# Patient Record
Sex: FEMALE | Race: WHITE | NOT HISPANIC OR LATINO | Employment: PART TIME | URBAN - METROPOLITAN AREA
[De-identification: names, ages, dates, MRNs, and addresses within clinical notes are randomized per-mention and may not be internally consistent; named-entity substitution may affect disease eponyms.]

---

## 2021-01-11 RX ORDER — NORETHINDRONE ACETATE AND ETHINYL ESTRADIOL 1MG-20(21)
1 KIT ORAL DAILY
Qty: 28 TABLET | Refills: 2 | Status: SHIPPED | OUTPATIENT
Start: 2021-01-11 | End: 2021-01-19 | Stop reason: SDUPTHER

## 2021-01-11 NOTE — TELEPHONE ENCOUNTER
Patient is [facility] so Is unable to come in due to the distance

## 2021-01-19 DIAGNOSIS — Z30.41 ENCOUNTER FOR SURVEILLANCE OF CONTRACEPTIVE PILLS: Primary | ICD-10-CM

## 2021-01-19 RX ORDER — NORETHINDRONE ACETATE AND ETHINYL ESTRADIOL 1MG-20(21)
1 KIT ORAL DAILY
Qty: 28 TABLET | Refills: 2 | Status: SHIPPED | OUTPATIENT
Start: 2021-01-19 | End: 2021-04-05

## 2021-01-19 NOTE — TELEPHONE ENCOUNTER
Patient's prescription that was sent 1/11/2021 was not sent to the correct [facility]; she is currently out of town for school. Placing Rx refill request to correct pharmacy ([address]). Annual appointment scheduled for 5/18/2021 at 1:30pm with Dr. Lopez.    Encouraged patient to call and ask her health center questions about the type of appointments they take so that she may remain consistent with her annual exams at times more convenient for her. Answered patient questions/concerns.

## 2021-04-05 DIAGNOSIS — Z30.41 ENCOUNTER FOR SURVEILLANCE OF CONTRACEPTIVE PILLS: ICD-10-CM

## 2021-04-05 RX ORDER — NORETHINDRONE ACETATE AND ETHINYL ESTRADIOL AND FERROUS FUMARATE 1MG-20(21)
KIT ORAL
Qty: 84 TABLET | Refills: 0 | Status: SHIPPED | OUTPATIENT
Start: 2021-04-05 | End: 2021-05-18 | Stop reason: SDUPTHER

## 2021-05-18 ENCOUNTER — OFFICE VISIT (OUTPATIENT)
Dept: OBSTETRICS AND GYNECOLOGY | Facility: CLINIC | Age: 21
End: 2021-05-18

## 2021-05-18 VITALS
DIASTOLIC BLOOD PRESSURE: 68 MMHG | WEIGHT: 172.4 LBS | BODY MASS INDEX: 30.55 KG/M2 | SYSTOLIC BLOOD PRESSURE: 112 MMHG | HEIGHT: 63 IN

## 2021-05-18 DIAGNOSIS — Z30.41 ENCOUNTER FOR SURVEILLANCE OF CONTRACEPTIVE PILLS: ICD-10-CM

## 2021-05-18 DIAGNOSIS — Z20.2 POSSIBLE EXPOSURE TO STD: Primary | ICD-10-CM

## 2021-05-18 DIAGNOSIS — Z01.419 ENCOUNTER FOR ANNUAL ROUTINE GYNECOLOGICAL EXAMINATION: ICD-10-CM

## 2021-05-18 PROCEDURE — 99395 PREV VISIT EST AGE 18-39: CPT | Performed by: OBSTETRICS & GYNECOLOGY

## 2021-05-18 RX ORDER — NORETHINDRONE ACETATE AND ETHINYL ESTRADIOL 1MG-20(21)
1 KIT ORAL DAILY
Qty: 84 TABLET | Refills: 3 | Status: SHIPPED | OUTPATIENT
Start: 2021-05-18 | End: 2022-06-16

## 2021-05-18 RX ORDER — ALBUTEROL SULFATE 90 UG/1
2 AEROSOL, METERED RESPIRATORY (INHALATION) EVERY 4 HOURS PRN
COMMUNITY

## 2021-05-18 NOTE — PROGRESS NOTES
GYN Annual Exam     CC- Here for annual exam.   Subjective   HPI  Beatriz Gallardo is a 20 y.o. female established patient who presents for annual well woman exam. Her periods are typically regular every 28-30 days, lasting 3-8 days and are variant in flow and clots are present (when heavy).  She reports moderate dysmenorrhea. Tylenol (or Aleve) typically helps.  Partner Status: Marital Status: single.  New Partners since last visit: no  Condom usage with IC: always.      The patient reports she had an extra menstrual this cycle. LMP 21; menstrual prior 21. This was the first time the patient experienced menstruals so close in frequency.    Patient requesting gonorrhea/chlamydia testing to be performed on urine.    She exercises regularly: yes.  She has concerns about domestic violence: no.    OB History        0    Para   0    Term   0       0    AB   0    Living   0       SAB   0    TAB   0    Ectopic   0    Molar   0    Multiple   0    Live Births   0                Menarche: 13  Current contraception: OCP (estrogen/progesterone)  History of abnormal Pap smear: n/a  Family history of uterine, colon or ovarian cancer: no  Family history of breast cancer: yes - MGGM and PGGM breast  H/o STDs: no  Last pap: n/a  Gardasil: completed  Thromboembolic Disease: uncertain (possible in grandmother who had a stroke)    Health Maintenance   Topic Date Due   • ANNUAL PHYSICAL  Never done   • TDAP/TD VACCINES (1 - Tdap) Never done   • HEPATITIS C SCREENING  Never done   • CHLAMYDIA SCREENING  Never done   • INFLUENZA VACCINE  2021   • COVID-19 Vaccine  Completed   • MENINGOCOCCAL VACCINE  Completed   • HPV VACCINES  Completed   • Pneumococcal Vaccine 0-64  Aged Out       The following portions of the patient's history were reviewed and updated as appropriate: allergies, current medications, past family history, past medical history, past social history, past surgical history and problem list.    Review  "of Systems  Review of Systems   Constitutional: Negative.    HENT: Negative.    Eyes: Negative.    Respiratory: Negative.    Cardiovascular: Negative.    Gastrointestinal: Negative.    Endocrine: Negative.    Genitourinary: Positive for menstrual problem.   Musculoskeletal: Negative.    Skin: Negative.    Allergic/Immunologic: Negative.    Neurological: Negative.    Hematological: Negative.    Psychiatric/Behavioral: Negative.        I have reviewed and agree with the HPI, ROS, and historical information as entered above. Dayana Lopez MD      Past Medical History:   Diagnosis Date   • Anxiety    • Asthma    • Auditory processing disorder     unable to hear high/low pitches; sometimes unable to fully comprehend people when they speak   • Depression    • Gardasil injection series complete    • History of concussion 02/2021        Past Surgical History:   Procedure Laterality Date   • WISDOM TOOTH EXTRACTION            Objective   /68 (BP Location: Right arm, Patient Position: Sitting, Cuff Size: Adult)   Ht 160 cm (63\")   Wt 78.2 kg (172 lb 6.4 oz)   LMP 05/07/2021   Breastfeeding No   BMI 30.54 kg/m²   Physical Exam  Vitals and nursing note reviewed. Exam conducted with a chaperone present.   Constitutional:       Appearance: She is well-developed.   HENT:      Head: Normocephalic and atraumatic.   Cardiovascular:      Rate and Rhythm: Normal rate and regular rhythm.   Pulmonary:      Effort: Pulmonary effort is normal.      Breath sounds: Normal breath sounds.   Abdominal:      General: Bowel sounds are normal.      Palpations: Abdomen is soft. Abdomen is not rigid.   Musculoskeletal:      Cervical back: Normal range of motion. No muscular tenderness.   Skin:     General: Skin is warm and dry.   Neurological:      Mental Status: She is alert and oriented to person, place, and time.   Psychiatric:         Behavior: Behavior normal.            Assessment    Encounter Diagnoses   Name Primary?   • " Possible exposure to STD Yes   • Encounter for annual routine gynecological examination    • Encounter for surveillance of contraceptive pills        Plan     1. Encouraged use of condoms for STD prevention.   Cultures today as quality measure.   2. Happy on current SABINO - understands inherent risks including increase lifetime risk of breast cancer dx, VTE and stroke.  Understands SABINO reduce lifetime risk of ovarian cancer. Understands other options of BC.  3. Recommended use of Vitamin D replacement and getting adequate calcium in her diet. (1500mg)  4. RTC in 1 year or PRN with problems           Dayana Lopez MD  05/18/2021

## 2021-05-20 LAB
C TRACH RRNA SPEC QL NAA+PROBE: NEGATIVE
N GONORRHOEA RRNA SPEC QL NAA+PROBE: NEGATIVE

## 2022-06-16 DIAGNOSIS — Z30.41 ENCOUNTER FOR SURVEILLANCE OF CONTRACEPTIVE PILLS: ICD-10-CM

## 2022-06-16 RX ORDER — NORETHINDRONE ACETATE AND ETHINYL ESTRADIOL AND FERROUS FUMARATE 1MG-20(21)
KIT ORAL
Qty: 28 TABLET | Refills: 0 | Status: SHIPPED | OUTPATIENT
Start: 2022-06-16 | End: 2022-07-06 | Stop reason: SDUPTHER

## 2022-07-06 ENCOUNTER — OFFICE VISIT (OUTPATIENT)
Dept: OBSTETRICS AND GYNECOLOGY | Facility: CLINIC | Age: 22
End: 2022-07-06

## 2022-07-06 VITALS
SYSTOLIC BLOOD PRESSURE: 110 MMHG | BODY MASS INDEX: 31.36 KG/M2 | WEIGHT: 177 LBS | DIASTOLIC BLOOD PRESSURE: 72 MMHG | HEIGHT: 63 IN

## 2022-07-06 DIAGNOSIS — Z30.41 ENCOUNTER FOR SURVEILLANCE OF CONTRACEPTIVE PILLS: ICD-10-CM

## 2022-07-06 DIAGNOSIS — Z01.419 ROUTINE GYNECOLOGICAL EXAMINATION: Primary | ICD-10-CM

## 2022-07-06 PROCEDURE — 99395 PREV VISIT EST AGE 18-39: CPT | Performed by: OBSTETRICS & GYNECOLOGY

## 2022-07-06 RX ORDER — NORETHINDRONE ACETATE AND ETHINYL ESTRADIOL 1MG-20(21)
1 KIT ORAL DAILY
Qty: 84 TABLET | Refills: 3 | Status: SHIPPED | OUTPATIENT
Start: 2022-07-06

## 2022-07-06 NOTE — PROGRESS NOTES
GYN Annual Exam     CC- Here for annual exam.   Subjective   HPI  Beatriz Gallardo is a 21 y.o. female established patient who presents for annual well woman exam. Her periods are becoming irregular on OCP, lasting 5 days and are light and clots are absent.  She has occ dysmenorrhea but much improved from the past.  Partner Status: Marital Status: single.  New Partners since last visit: yes  Condom usage with IC: always.      The patient does not have complaints today. The patient has only had light spotting the past 3 months instead of a regular period.    She exercises regularly: no.  She has concerns about domestic violence: no.    OB History        0    Para   0    Term   0       0    AB   0    Living   0       SAB   0    IAB   0    Ectopic   0    Molar   0    Multiple   0    Live Births   0                Age of menarche:   Current contraception: OCP (estrogen/progesterone)  History of abnormal Pap smear: no  Family history of uterine, colon or ovarian cancer: no  Family history of breast cancer: no  H/o STDs: no  Last pap:never  Gardasil:completed  Thromboembolic Disease: none    Health Maintenance   Topic Date Due   • ANNUAL PHYSICAL  Never done   • HEPATITIS C SCREENING  Never done   • COVID-19 Vaccine (3 - Booster for Pfizer series) 2021   • CHLAMYDIA SCREENING  2022   • TDAP/TD VACCINES (2 - Td or Tdap) 2022   • INFLUENZA VACCINE  10/01/2022   • MENINGOCOCCAL VACCINE  Completed   • HPV VACCINES  Completed   • Pneumococcal Vaccine 0-64  Aged Out       The following portions of the patient's history were reviewed and updated as appropriate: problem list.    Review of Systems  Review of Systems   All other systems reviewed and are negative.      I have reviewed and agree with the HPI, ROS, and historical information as entered above. Dayana Lopez MD      Past Medical History:   Diagnosis Date   • Anxiety    • Asthma    • Auditory processing disorder     unable to hear high/low  "pitches; sometimes unable to fully comprehend people when they speak   • Depression    • Gardasil injection series complete    • History of concussion 02/2021        Past Surgical History:   Procedure Laterality Date   • WISDOM TOOTH EXTRACTION             Objective   /72   Ht 160 cm (63\")   Wt 80.3 kg (177 lb)   LMP  (LMP Unknown)   Breastfeeding No   BMI 31.35 kg/m²   Physical Exam  Vitals and nursing note reviewed. Exam conducted with a chaperone present.   Constitutional:       Appearance: She is well-developed.   HENT:      Head: Normocephalic and atraumatic.   Neck:      Thyroid: No thyroid mass or thyromegaly.   Cardiovascular:      Rate and Rhythm: Normal rate and regular rhythm.      Heart sounds: No murmur heard.  Pulmonary:      Effort: Pulmonary effort is normal. No retractions.      Breath sounds: Normal breath sounds. No wheezing, rhonchi or rales.   Chest:      Chest wall: No mass or tenderness.   Breasts:      Right: Normal. No mass, nipple discharge, skin change or tenderness.      Left: Normal. No mass, nipple discharge, skin change or tenderness.       Abdominal:      General: Bowel sounds are normal.      Palpations: Abdomen is soft. Abdomen is not rigid. There is no mass.      Tenderness: There is no abdominal tenderness. There is no guarding.      Hernia: No hernia is present. There is no hernia in the left inguinal area.   Genitourinary:     Labia:         Right: No rash, tenderness or lesion.         Left: No rash, tenderness or lesion.       Vagina: Normal. No vaginal discharge or lesions.      Cervix: No cervical motion tenderness, discharge, lesion or cervical bleeding.      Uterus: Normal. Not enlarged, not fixed and not tender.       Adnexa:         Right: No mass or tenderness.          Left: No mass or tenderness.        Rectum: No external hemorrhoid.   Musculoskeletal:      Cervical back: Normal range of motion. No muscular tenderness.   Neurological:      Mental Status: " She is alert and oriented to person, place, and time.   Psychiatric:         Behavior: Behavior normal.            Assessment    Encounter Diagnoses   Name Primary?   • Routine gynecological examination Yes   • Encounter for surveillance of contraceptive pills        Plan     1. Encouraged use of condoms for STD prevention.   Cultures today as quality measure.   2. Happy on current SABINO - understands inherent risks including increase lifetime risk of breast cancer dx, VTE and stroke.  Understands SABINO reduce lifetime risk of ovarian cancer. Understands other options of BC.  Discussed pill related amenorrhea and reassurance given  3. Recommended use of Vitamin D replacement and getting adequate calcium in her diet. (1500mg)  4. Reviewed monthly self breast exams.  Instructed to call with lumps, pain, or breast discharge.    5. RTC in 1 year or PRN with problems  Return in about 1 year (around 7/6/2023).         Dayana Lopez MD  07/06/2022

## 2022-07-29 ENCOUNTER — HOSPITAL ENCOUNTER (OUTPATIENT)
Dept: GENERAL RADIOLOGY | Facility: HOSPITAL | Age: 22
Discharge: HOME | End: 2022-07-29
Admitting: INTERNAL MEDICINE

## 2022-07-29 ENCOUNTER — TRANSCRIBE ORDERS (OUTPATIENT)
Dept: ADMINISTRATIVE | Facility: HOSPITAL | Age: 22
End: 2022-07-29

## 2022-07-29 DIAGNOSIS — J45.20 MILD INTERMITTENT ASTHMA WITH ALLERGIC RHINITIS, UNSPECIFIED WHETHER COMPLICATED: Primary | ICD-10-CM

## 2022-07-29 PROCEDURE — 71046 X-RAY EXAM CHEST 2 VIEWS: CPT

## 2022-12-26 ENCOUNTER — APPOINTMENT (OUTPATIENT)
Dept: CT IMAGING | Facility: HOSPITAL | Age: 22
End: 2022-12-26

## 2022-12-26 ENCOUNTER — HOSPITAL ENCOUNTER (EMERGENCY)
Facility: HOSPITAL | Age: 22
Discharge: HOME | End: 2022-12-26
Attending: EMERGENCY MEDICINE | Admitting: EMERGENCY MEDICINE

## 2022-12-26 VITALS
OXYGEN SATURATION: 100 % | BODY MASS INDEX: 32.1 KG/M2 | HEIGHT: 61 IN | DIASTOLIC BLOOD PRESSURE: 66 MMHG | WEIGHT: 170 LBS | HEART RATE: 83 BPM | TEMPERATURE: 98.2 F | SYSTOLIC BLOOD PRESSURE: 125 MMHG | RESPIRATION RATE: 18 BRPM

## 2022-12-26 DIAGNOSIS — S09.90XA INJURY OF HEAD, INITIAL ENCOUNTER: Primary | ICD-10-CM

## 2022-12-26 PROCEDURE — 99282 EMERGENCY DEPT VISIT SF MDM: CPT

## 2022-12-26 PROCEDURE — 70450 CT HEAD/BRAIN W/O DYE: CPT

## 2022-12-26 NOTE — DISCHARGE INSTRUCTIONS
Alternate Tylenol and ibuprofen.    Increase rest.    I have sent home information on postconcussive syndrome.

## 2022-12-26 NOTE — ED PROVIDER NOTES
Subjective   History of Present Illness  Beatriz Gallardo is a 22 yr old female presents to the ER with c/o head injury.  Patient advises that she was at work on [employer].  She lifted the door to the .  Patient advises that icemaker door hit the top of her head.  Patient did not lose consciousness however she felt dizzy.  Patient has continued to experience nausea, sensitivity to light.  She reports difficulty focusing.  Patient reports previous concussions in the past.    History provided by:  Patient   used: No    Head Injury  Location:  Generalized  Mechanism of injury: direct blow    Pain details:     Quality:  Aching    Duration:  2 days    Timing:  Constant    Progression:  Worsening  Associated symptoms: headache and nausea    Associated symptoms: no blurred vision, no focal weakness, no loss of consciousness, no neck pain, no numbness and no vomiting        Review of Systems   Eyes: Positive for photophobia. Negative for blurred vision.   Respiratory: Negative for shortness of breath.    Cardiovascular: Negative for chest pain.   Gastrointestinal: Positive for nausea. Negative for vomiting.   Musculoskeletal: Negative for neck pain.   Neurological: Positive for headaches. Negative for focal weakness, loss of consciousness and numbness.   All other systems reviewed and are negative.      Past Medical History:   Diagnosis Date   • Anxiety    • Asthma    • Auditory processing disorder     unable to hear high/low pitches; sometimes unable to fully comprehend people when they speak   • Depression    • Gardasil injection series complete    • History of concussion 02/2021       Allergies   Allergen Reactions   • Doxycycline Hives and Mental Status Change     Night terrors   • Lactose Intolerance (Gi) GI Intolerance       Past Surgical History:   Procedure Laterality Date   • WISDOM TOOTH EXTRACTION         Family History   Problem Relation Age of Onset   • Sleep apnea Paternal  Grandfather    • Heart attack Paternal Grandfather    • Arthritis Paternal Grandmother    • Dementia Maternal Grandmother    • Stroke Maternal Grandmother    • Fibromyalgia Maternal Grandmother    • Heart attack Maternal Grandmother    • Breast cancer Maternal Aunt    • Breast cancer Paternal Great-Grandmother    • Colon cancer Maternal Great-Grandmother    • Breast cancer Maternal Great-Grandmother    • Bone cancer Maternal Great-Grandfather        Social History     Socioeconomic History   • Marital status: Single   • Number of children: 0   Tobacco Use   • Smoking status: Never   • Smokeless tobacco: Never   Vaping Use   • Vaping Use: Never used   Substance and Sexual Activity   • Alcohol use: Never   • Drug use: Never   • Sexual activity: Yes     Partners: Male     Birth control/protection: Pill, Condom           Objective   Physical Exam  Vitals and nursing note reviewed.   Constitutional:       Appearance: Normal appearance. She is well-developed. She is not toxic-appearing.   HENT:      Head: Normocephalic and atraumatic.      Nose: Nose normal.      Mouth/Throat:      Mouth: Mucous membranes are moist.   Eyes:      General: Lids are normal.      Extraocular Movements: Extraocular movements intact.      Conjunctiva/sclera: Conjunctivae normal.      Pupils: Pupils are equal, round, and reactive to light.   Neck:      Trachea: Trachea normal.   Cardiovascular:      Rate and Rhythm: Regular rhythm.      Pulses: Normal pulses.      Heart sounds: Normal heart sounds.   Pulmonary:      Effort: Pulmonary effort is normal. No respiratory distress.      Breath sounds: Normal breath sounds. No decreased breath sounds, wheezing, rhonchi or rales.   Abdominal:      General: Bowel sounds are normal.      Palpations: Abdomen is soft.      Tenderness: There is no abdominal tenderness.   Musculoskeletal:         General: Normal range of motion.      Cervical back: Full passive range of motion without pain and normal range of  "motion. No tenderness.   Skin:     General: Skin is warm and dry.      Findings: No rash.   Neurological:      Mental Status: She is alert and oriented to person, place, and time.      Cranial Nerves: No cranial nerve deficit.   Psychiatric:         Speech: Speech normal.         Behavior: Behavior normal. Behavior is cooperative.         Procedures           ED Course  ED Course as of 12/26/22 1704   Mon Dec 26, 2022   1655 Results are discussed with patient at this time.  Patient will be discharged home.  Patient to follow-up with PCP.  Information on postconcussive syndrome has been given to the patient.  Patient to return as needed.  Patient agrees and verbalized understanding. [KG]      ED Course User Index  [KG] Gail Lord, APRN           No results found for this or any previous visit (from the past 24 hour(s)).  Note: In addition to lab results from this visit, the labs listed above may include labs taken at another facility or during a different encounter within the last 24 hours. Please correlate lab times with ED admission and discharge times for further clarification of the services performed during this visit.    CT Head Without Contrast   Final Result   No acute intracranial process identified.       This report was finalized on 12/26/2022 4:39 PM by Abhay Hernandez MD.            Vitals:    12/26/22 1447   BP: 125/66   BP Location: Left arm   Patient Position: Sitting   Pulse: 83   Resp: 18   Temp: 98.2 °F (36.8 °C)   TempSrc: Oral   SpO2: 100%   Weight: 77.1 kg (170 lb)   Height: 154.9 cm (61\")     Medications - No data to display  ECG/EMG Results (last 24 hours)     ** No results found for the last 24 hours. **        No orders to display                                       MDM    Final diagnoses:   Injury of head, initial encounter       ED Disposition  ED Disposition     ED Disposition   Discharge    Condition   Stable    Comment   --             Ant Dueñas, APRN  [id number] " [address] [address]  [phone]               Medication List      No changes were made to your prescriptions during this visit.          Gail Lord, APRN  12/26/22 8366

## 2023-04-24 ENCOUNTER — TELEPHONE (OUTPATIENT)
Dept: OBSTETRICS AND GYNECOLOGY | Facility: CLINIC | Age: 23
End: 2023-04-24
Payer: OTHER MISCELLANEOUS

## 2023-04-24 NOTE — TELEPHONE ENCOUNTER
Pt called and wanted to speak to someone about getting off her BC. She wants to know if she can just stop taking it or if she needs to taper off of it.     Please advise

## 2023-04-24 NOTE — TELEPHONE ENCOUNTER
Discussed discontinuing BC. Finish current pack and do not start new one. Medication therapy discontinued.

## 2023-06-15 DIAGNOSIS — Z30.41 ENCOUNTER FOR SURVEILLANCE OF CONTRACEPTIVE PILLS: ICD-10-CM

## 2023-06-15 RX ORDER — NORETHINDRONE ACETATE AND ETHINYL ESTRADIOL AND FERROUS FUMARATE 1MG-20(21)
KIT ORAL
Qty: 84 TABLET | Refills: 0 | Status: SHIPPED | OUTPATIENT
Start: 2023-06-15

## 2023-08-22 ENCOUNTER — OFFICE VISIT (OUTPATIENT)
Dept: OBSTETRICS AND GYNECOLOGY | Facility: CLINIC | Age: 23
End: 2023-08-22
Payer: COMMERCIAL

## 2023-08-22 VITALS
HEIGHT: 61 IN | SYSTOLIC BLOOD PRESSURE: 100 MMHG | BODY MASS INDEX: 35.3 KG/M2 | WEIGHT: 187 LBS | DIASTOLIC BLOOD PRESSURE: 68 MMHG

## 2023-08-22 DIAGNOSIS — N94.6 DYSMENORRHEA: ICD-10-CM

## 2023-08-22 DIAGNOSIS — E28.2 PCOS (POLYCYSTIC OVARIAN SYNDROME): Primary | ICD-10-CM

## 2023-08-22 DIAGNOSIS — N91.5 OLIGOMENORRHEA, UNSPECIFIED TYPE: ICD-10-CM

## 2023-08-22 DIAGNOSIS — R63.5 WEIGHT GAIN: ICD-10-CM

## 2023-08-22 NOTE — PROGRESS NOTES
"          Chief Complaint   Patient presents with    Follow-up         Subjective   HPI  Beatriz Gallardo is a 23 y.o. female, , her last LMP was Patient's last menstrual period was 2023. She presents for a follow up evaluation of Menorrhagia and Dysmenorrhea. The patient was last seen since 2023  ago by Dayana Lopez MD. At that time she reported \"periods occur every 35-40+ days, lasting 5-10 days. The flow is heavy saturating a pad/tampon every 2 hours. This heavy bleeding lasts for 3-4 days of her period. She reports dysmenorrhea is severe, occurring premenstrually and first 1-2 days of flow\". She had labs drawn. The plan was  begin OCP and return for US . Since the last visit the patient reports the plan has worsened. Patient states that she noticed a vasovagal response, worsened menorrhagia and dysmenorrhea with SABINO. Patient states that she stopped SABINO in 2023 because of these side effects. Patient states that she has not had a period since 2023. Patient reports h/o irregular periods in the past when very active. Patient states that she would go up to 3 months without a period. Patient states that she is more active, but is not as active as she was when she would skip periods. Patient states that she has not been sexually active since 2022. This has been an issue in the past. The patient reports additional symptoms as none.      US was done today.       Additional OB/GYN History   Last Pap :   Last Completed Pap Smear            PAP SMEAR (Every 3 Years) Next due on 2023  LIQUID-BASED PAP SMEAR WITH HPV GENOTYPING IF ASCUS ([facility])                  History of abnormal Pap smear: no  Tobacco Usage?: No       Current Outpatient Medications:     albuterol sulfate  (90 Base) MCG/ACT inhaler, Inhale 2 puffs Every 4 (Four) Hours As Needed for Wheezing., Disp: , Rfl:     ELDERBERRY PO, Take  by mouth., Disp: , Rfl:     ferrous sulfate 325 (65 FE) MG " "tablet, Take 1 tablet by mouth Every Other Day., Disp: , Rfl:     VITAMIN D, CHOLECALCIFEROL, PO, Take  by mouth., Disp: , Rfl:      Past Medical History:   Diagnosis Date    Anemia 07/2022    Anxiety     Asthma     Auditory processing disorder     unable to hear high/low pitches; sometimes unable to fully comprehend people when they speak    Depression     Gardasil injection series complete     History of concussion 02/2021        Past Surgical History:   Procedure Laterality Date    WISDOM TOOTH EXTRACTION         The additional following portions of the patient's history were reviewed and updated as appropriate: allergies and current medications.    Review of Systems   Constitutional: Negative.    HENT: Negative.     Eyes: Negative.    Respiratory: Negative.     Cardiovascular: Negative.    Gastrointestinal: Negative.    Endocrine: Negative.    Genitourinary:  Positive for amenorrhea and menstrual problem.   Musculoskeletal: Negative.    Skin: Negative.    Allergic/Immunologic: Negative.    Neurological: Negative.    Hematological: Negative.    Psychiatric/Behavioral: Negative.       I have reviewed and agree with the HPI, ROS, and historical information as entered above. Dayana Lopez MD      Objective   /68   Ht 154.9 cm (61\")   Wt 84.8 kg (187 lb)   LMP 06/05/2023   BMI 35.33 kg/mý     Physical Exam  Constitutional:       Appearance: She is well-developed.   HENT:      Head: Normocephalic.   Eyes:      Conjunctiva/sclera: Conjunctivae normal.   Pulmonary:      Effort: Pulmonary effort is normal.   Psychiatric:         Behavior: Behavior normal.       Assessment & Plan       Encounter Diagnoses   Name Primary?    PCOS (polycystic ovarian syndrome) Yes    Oligomenorrhea, unspecified type     Weight gain     Dysmenorrhea            Plan     Dysmenorrhea - discussed potential for having endometriosis.  U/S today appeared normal.  She does have a family history so since she prefers to be off SABINO " encouraged to consider laparoscopy.  Irregularity off pills - she also c/o weight gain. Will check PCOS labs and info given on this.  Rec Provera q 3 months to prevent lining overgrowth and consideration of Metformin.  Will discuss further when labs are back.  Weight gain - she qualifies for GLP 1 agonist but her insurance will not cover.       Dayana Lopez MD  08/22/2023

## 2023-08-26 LAB
17OHP SERPL-MCNC: 36 NG/DL
DHEA SERPL-MCNC: 308 NG/DL (ref 31–701)
FSH SERPL-ACNC: 7.4 MIU/ML
LH SERPL-ACNC: 18 MIU/ML
T4 FREE SERPL-MCNC: 0.93 NG/DL (ref 0.82–1.77)

## 2023-11-14 ENCOUNTER — OFFICE VISIT (OUTPATIENT)
Dept: OBSTETRICS AND GYNECOLOGY | Facility: CLINIC | Age: 23
End: 2023-11-14
Payer: COMMERCIAL

## 2023-11-14 VITALS
DIASTOLIC BLOOD PRESSURE: 76 MMHG | WEIGHT: 185.8 LBS | HEIGHT: 61 IN | BODY MASS INDEX: 35.08 KG/M2 | SYSTOLIC BLOOD PRESSURE: 106 MMHG

## 2023-11-14 DIAGNOSIS — E28.2 PCOS (POLYCYSTIC OVARIAN SYNDROME): Primary | ICD-10-CM

## 2023-11-14 DIAGNOSIS — N94.6 DYSMENORRHEA: ICD-10-CM

## 2023-11-14 RX ORDER — MEDROXYPROGESTERONE ACETATE 10 MG/1
10 TABLET ORAL DAILY
Qty: 10 TABLET | Refills: 0 | Status: SHIPPED | OUTPATIENT
Start: 2023-11-14 | End: 2023-11-24

## 2023-11-14 RX ORDER — METFORMIN HYDROCHLORIDE 500 MG/1
500 TABLET, EXTENDED RELEASE ORAL
Qty: 90 TABLET | Refills: 11 | Status: SHIPPED | OUTPATIENT
Start: 2023-11-14

## 2023-11-14 NOTE — PROGRESS NOTES
Chief Complaint   Patient presents with    Follow-up       Subjective   HPI  Beatriz Gallardo is a 23 y.o. female, . Her last LMP was Patient's last menstrual period was 2023 (approximate).  who presents for follow up on dysmenorrhea.  Her larger concern however is amenorrhea.  She has not had a period since May.  We have discussed PCOS possibility in the past and she is ready to try Metformin/glucophage.    Patient had previously taken SABINO for dysmenorrhea and menorrhagia. She stopped her SABINO in 2023. Patient had lab work & an US at her last visit on 23. Patient has not had a period since 23 and would like to discuss surgical options. Patient has a family history of endometriosis.       Additional OB/GYN History     Last Pap :   Last Completed Pap Smear            PAP SMEAR (Every 3 Years) Next due on 2023  LIQUID-BASED PAP SMEAR WITH HPV GENOTYPING IF ASCUS ([facility])                    Last mammogram:   Last Completed Mammogram       This patient has no relevant Health Maintenance data.            Tobacco Usage?: No   OB History          0    Para   0    Term   0       0    AB   0    Living   0         SAB   0    IAB   0    Ectopic   0    Molar   0    Multiple   0    Live Births   0                  Current Outpatient Medications:     albuterol sulfate  (90 Base) MCG/ACT inhaler, Inhale 2 puffs Every 4 (Four) Hours As Needed for Wheezing., Disp: , Rfl:     ELDERBERRY PO, Take  by mouth., Disp: , Rfl:     ferrous sulfate 325 (65 FE) MG tablet, Take 1 tablet by mouth Every Other Day., Disp: , Rfl:     VITAMIN D, CHOLECALCIFEROL, PO, Take  by mouth. Every other day, Disp: , Rfl:      Past Medical History:   Diagnosis Date    Anemia 2022    Anxiety     Asthma     Auditory processing disorder     unable to hear high/low pitches; sometimes unable to fully comprehend people when they speak    Depression     Gardasil injection  "series complete     History of concussion 02/2021        Past Surgical History:   Procedure Laterality Date    WISDOM TOOTH EXTRACTION         The additional following portions of the patient's history were reviewed and updated as appropriate: allergies and current medications.    Review of Systems   Constitutional: Negative.    HENT: Negative.     Eyes: Negative.    Respiratory: Negative.     Cardiovascular: Negative.    Gastrointestinal: Negative.    Endocrine: Negative.    Genitourinary: Negative.    Musculoskeletal: Negative.    Skin: Negative.    Allergic/Immunologic: Negative.    Neurological: Negative.    Hematological: Negative.    Psychiatric/Behavioral: Negative.         I have reviewed and agree with the HPI, ROS, and historical information as entered above. Dayana Lopez MD      Objective   /76   Ht 154.9 cm (61\")   Wt 84.3 kg (185 lb 12.8 oz)   LMP 05/29/2023 (Approximate)   BMI 35.11 kg/m²     Physical Exam  Constitutional:       Appearance: She is well-developed.   HENT:      Head: Normocephalic.   Eyes:      Conjunctiva/sclera: Conjunctivae normal.   Pulmonary:      Effort: Pulmonary effort is normal.   Psychiatric:         Behavior: Behavior normal.         Assessment & Plan     Encounter Diagnoses   Name Primary?    PCOS (polycystic ovarian syndrome) Yes    Dysmenorrhea        Plan     PCOS with amenorrhea - will start Metformin and titrate to three a day.  Provera now to cause a withdraw bleed.  Painful periods - she is not currently having periods and not ready to do the laparoscopy at this time.  Return in about 6 months (around 5/14/2024).        Dayana Lopez MD  11/14/2023  "

## 2024-05-14 ENCOUNTER — OFFICE VISIT (OUTPATIENT)
Dept: OBSTETRICS AND GYNECOLOGY | Facility: CLINIC | Age: 24
End: 2024-05-14
Payer: COMMERCIAL

## 2024-05-14 VITALS
WEIGHT: 186 LBS | HEIGHT: 61 IN | BODY MASS INDEX: 35.12 KG/M2 | SYSTOLIC BLOOD PRESSURE: 108 MMHG | DIASTOLIC BLOOD PRESSURE: 70 MMHG

## 2024-05-14 DIAGNOSIS — E28.2 PCOS (POLYCYSTIC OVARIAN SYNDROME): Primary | ICD-10-CM

## 2024-05-14 PROCEDURE — 99213 OFFICE O/P EST LOW 20 MIN: CPT | Performed by: OBSTETRICS & GYNECOLOGY

## 2024-05-14 RX ORDER — METFORMIN HYDROCHLORIDE 500 MG/1
500 TABLET, EXTENDED RELEASE ORAL
Qty: 270 TABLET | Refills: 3 | Status: SHIPPED | OUTPATIENT
Start: 2024-05-14

## 2024-05-14 NOTE — PROGRESS NOTES
Chief Complaint   Patient presents with    Follow-up     PCOS with amenorrhea        Subjective   HPI  Beatriz Gallardo is a 23 y.o. female, . Her last LMP was Patient's last menstrual period was 05/10/2024. She presents for follow up on PCOS with amenorrhea.      At her last visit in November, she was treated with  Metformin and Provera . Patient reports that she started having periods at the end of November. Periods were every other month until this past month. Patient had a period at the end of April and then again 2 weeks later. Patient states that she discontinued the Metformin and Provera in January. The patient reports additional symptoms as none.        Additional OB/GYN History     Last Pap :   Last Completed Pap Smear            PAP SMEAR (Every 3 Years) Next due on 2023  LIQUID-BASED PAP SMEAR WITH HPV GENOTYPING IF ASCUS ([facility])                    Last mammogram:   Last Completed Mammogram       This patient has no relevant Health Maintenance data.            Tobacco Usage?: No   OB History          0    Para   0    Term   0       0    AB   0    Living   0         SAB   0    IAB   0    Ectopic   0    Molar   0    Multiple   0    Live Births   0                  Current Outpatient Medications:     albuterol sulfate  (90 Base) MCG/ACT inhaler, Inhale 2 puffs Every 4 (Four) Hours As Needed for Wheezing., Disp: , Rfl:     ELDERBERRY PO, Take  by mouth., Disp: , Rfl:     ferrous sulfate 325 (65 FE) MG tablet, Take 1 tablet by mouth Every Other Day., Disp: , Rfl:     VITAMIN D, CHOLECALCIFEROL, PO, Take  by mouth. Every other day, Disp: , Rfl:     metFORMIN ER (GLUCOPHAGE-XR) 500 MG 24 hr tablet, Take 1 tablet by mouth Daily With Breakfast. (Patient not taking: Reported on 2024), Disp: 90 tablet, Rfl: 11     Past Medical History:   Diagnosis Date    Anemia 2022    Anxiety     Asthma     Auditory processing disorder     unable to hear  "high/low pitches; sometimes unable to fully comprehend people when they speak    Depression     Gardasil injection series complete     History of concussion 02/2021        Past Surgical History:   Procedure Laterality Date    WISDOM TOOTH EXTRACTION         The additional following portions of the patient's history were reviewed and updated as appropriate: allergies and current medications.    Review of Systems   Constitutional: Negative.    HENT: Negative.     Eyes: Negative.    Respiratory: Negative.     Cardiovascular: Negative.    Gastrointestinal: Negative.    Endocrine: Negative.    Genitourinary: Negative.    Musculoskeletal: Negative.    Skin: Negative.    Allergic/Immunologic: Negative.    Neurological: Negative.    Hematological: Negative.    Psychiatric/Behavioral: Negative.         I have reviewed and agree with the HPI, ROS, and historical information as entered above. Dayana Lopez MD      Objective   /70   Ht 154.9 cm (61\")   Wt 84.4 kg (186 lb)   LMP 05/10/2024   BMI 35.14 kg/m²     Physical Exam  Constitutional:       Appearance: She is well-developed.   HENT:      Head: Normocephalic.   Eyes:      Conjunctiva/sclera: Conjunctivae normal.   Pulmonary:      Effort: Pulmonary effort is normal.   Psychiatric:         Behavior: Behavior normal.       Assessment & Plan     Encounter Diagnosis   Name Primary?    PCOS (polycystic ovarian syndrome) Yes         Plan     PCOS - she did resume periods after starting metformin however stopped the metformin when she could not tolerate three a day.  Discussed trying again and seeing if two a day will help with regularity.  She is in agreement.  Considering going back on SABINO.  She will let me know how to proceed.  Encouraged to stay on glucophage with the SABINO.  Return in about 6 months (around 11/14/2024) for Annual physical.        Dayana Lopez MD  05/14/2024  "

## 2024-11-19 ENCOUNTER — OFFICE VISIT (OUTPATIENT)
Dept: OBSTETRICS AND GYNECOLOGY | Facility: CLINIC | Age: 24
End: 2024-11-19
Payer: COMMERCIAL

## 2024-11-19 VITALS — SYSTOLIC BLOOD PRESSURE: 108 MMHG | WEIGHT: 190 LBS | DIASTOLIC BLOOD PRESSURE: 70 MMHG | BODY MASS INDEX: 35.9 KG/M2

## 2024-11-19 DIAGNOSIS — Z80.0 FAMILY HISTORY OF COLON CANCER: ICD-10-CM

## 2024-11-19 DIAGNOSIS — Z01.419 WOMEN'S ANNUAL ROUTINE GYNECOLOGICAL EXAMINATION: ICD-10-CM

## 2024-11-19 DIAGNOSIS — Z12.39 ENCOUNTER FOR BREAST CANCER SCREENING USING NON-MAMMOGRAM MODALITY: ICD-10-CM

## 2024-11-19 DIAGNOSIS — E28.2 PCOS (POLYCYSTIC OVARIAN SYNDROME): ICD-10-CM

## 2024-11-19 DIAGNOSIS — Z01.419 PAP TEST, AS PART OF ROUTINE GYNECOLOGICAL EXAMINATION: Primary | ICD-10-CM

## 2024-11-19 NOTE — PROGRESS NOTES
Gynecologic Annual Exam Note        Gynecologic Exam        Subjective     HPI  Beatriz Gallardo is a 24 y.o.  female who presents for annual well woman exam as a established patient. There were no changes to her medical or surgical history since her last visit.. Patient's last menstrual period was 10/04/2024 (exact date).  Her periods are typically 26-28 days but she is currently on day 47   The flow is 7-8 days. The flow is heavy on the first 3 days then light. She reports dysmenorrhea is severe occurring first 1-2 days of flow. Marital Status: single.  She is sexually active. She has not had new partners.. STD testing recommendations have been explained to the patient and she does desire STD testing.    The patient would like to discuss the following complaints today: She reports she has not been taking Metformin regularly due to increased illness when taking consistently.      Additional OB/GYN History   contraceptive methods: Condoms  Desires to: do not start contraception  Thromboembolic Disease: none  History of migraines: no  History of STD: no    Last Pap : 23. Results: negative. HPV:  not done .   Last Completed Pap Smear            Ordered - PAP SMEAR (Every 3 Years) Ordered on 2024  LIQUID-BASED PAP SMEAR WITH HPV GENOTYPING IF ASCUS ([facility])                     History of abnormal Pap smear: no  Gardasil status:completed  Family history of uterine, colon, breast, or ovarian cancer: yes - paternal GGM with breast, maternal cousin with colon, mother with colon   Performs monthly Self-Breast Exam: yes  Exercises Regularly:no  Feelings of Anxiety or Depression: yes - declines therapy  Tobacco Usage?: No       Current Outpatient Medications:     albuterol sulfate  (90 Base) MCG/ACT inhaler, Inhale 2 puffs Every 4 (Four) Hours As Needed for Wheezing., Disp: , Rfl:     ELDERBERRY PO, Take  by mouth., Disp: , Rfl:     ferrous sulfate 325 (65 FE) MG tablet,  Take 1 tablet by mouth Every Other Day., Disp: , Rfl:     metFORMIN ER (GLUCOPHAGE-XR) 500 MG 24 hr tablet, Take 1 tablet by mouth Daily With Breakfast., Disp: 270 tablet, Rfl: 3    VITAMIN D, CHOLECALCIFEROL, PO, Take  by mouth. Every other day, Disp: , Rfl:      Patient denies the need for medication refills today.    OB History          0    Para   0    Term   0       0    AB   0    Living   0         SAB   0    IAB   0    Ectopic   0    Molar   0    Multiple   0    Live Births   0                Health Maintenance   Topic Date Due    BMI FOLLOWUP  Never done    HEPATITIS C SCREENING  Never done    ANNUAL PHYSICAL  Never done    TDAP/TD VACCINES (2 - Td or Tdap) 2022    CHLAMYDIA SCREENING  2024    Annual Gynecologic Pelvic and Breast Exam  2024    INFLUENZA VACCINE  2024    COVID-19 Vaccine (3 - 2024- season) 2024    PAP SMEAR  2026    HPV VACCINES  Completed    Pneumococcal Vaccine 0-64  Aged Out       Past Medical History:   Diagnosis Date    Anemia 2022    Anxiety     Asthma     Auditory processing disorder     unable to hear high/low pitches; sometimes unable to fully comprehend people when they speak    Depression     Gardasil injection series complete     History of concussion 2021        Past Surgical History:   Procedure Laterality Date    WISDOM TOOTH EXTRACTION         The additional following portions of the patient's history were reviewed and updated as appropriate: allergies, current medications, past family history, past medical history, past social history, and past surgical history.    Review of Systems      I have reviewed and agree with the HPI, ROS, and historical information as entered above. Dayana Lopez MD          Objective   /70   Wt 86.2 kg (190 lb)   LMP 10/04/2024 (Exact Date)   BMI 35.90 kg/m²     Physical Exam  Vitals and nursing note reviewed. Exam conducted with a chaperone present.   Constitutional:        Appearance: She is well-developed.   HENT:      Head: Normocephalic and atraumatic.   Neck:      Thyroid: No thyroid mass or thyromegaly.   Cardiovascular:      Rate and Rhythm: Normal rate and regular rhythm.      Heart sounds: No murmur heard.  Pulmonary:      Effort: Pulmonary effort is normal. No retractions.      Breath sounds: Normal breath sounds. No wheezing, rhonchi or rales.   Chest:      Chest wall: No mass or tenderness.   Breasts:     Right: Normal. No mass, nipple discharge, skin change or tenderness.      Left: Normal. No mass, nipple discharge, skin change or tenderness.   Abdominal:      General: Bowel sounds are normal.      Palpations: Abdomen is soft. Abdomen is not rigid. There is no mass.      Tenderness: There is no abdominal tenderness. There is no guarding.      Hernia: No hernia is present. There is no hernia in the left inguinal area.   Genitourinary:     Labia:         Right: No rash, tenderness or lesion.         Left: No rash, tenderness or lesion.       Vagina: Normal. No vaginal discharge or lesions.      Cervix: No cervical motion tenderness, discharge, lesion or cervical bleeding.      Uterus: Normal. Not enlarged, not fixed and not tender.       Adnexa:         Right: No mass or tenderness.          Left: No mass or tenderness.        Rectum: No external hemorrhoid.   Musculoskeletal:      Cervical back: Normal range of motion. No muscular tenderness.   Neurological:      Mental Status: She is alert and oriented to person, place, and time.   Psychiatric:         Behavior: Behavior normal.            Assessment and Plan    Problem List Items Addressed This Visit          Endocrine and Metabolic    PCOS (polycystic ovarian syndrome)    Relevant Orders    TSH    CBC (No Diff)    Comprehensive Metabolic Panel    Hemoglobin A1c    HCG, B-subunit, Quantitative       Family History    Family history of colon cancer     Other Visit Diagnoses       Pap test, as part of routine gynecological  examination    -  Primary    Relevant Orders    Pap IG, Ct-Ng, Rfx HPV ASCU    Women's annual routine gynecological examination        Encounter for breast cancer screening using non-mammogram modality                GYN annual well woman exam.   Reviewed pap guidelines.   Encouraged use of condoms for STD prevention.   Cultures today as quality measure.   Recommended use of Vitamin D replacement and getting adequate calcium in her diet. (1500mg)  Reviewed monthly self breast exams.  Instructed to call with lumps, pain, or breast discharge.    Reviewed HPV guidelines and encouraged consideration of HPV vaccine  Reviewed BMI and weight loss as preventative health measures.   PCOS - she does not want to go on SABINO.  She will get back on track with her Metformin and Benson innositol.  Hopefully will regulate her periods. PCOS labs today.  RTC in 1 year or PRN with problems       Dayana Lopez MD  11/19/2024

## 2024-11-20 LAB
ALBUMIN SERPL-MCNC: 4.3 G/DL (ref 3.5–5.2)
ALBUMIN/GLOB SERPL: 1.5 G/DL
ALP SERPL-CCNC: 76 U/L (ref 39–117)
ALT SERPL-CCNC: 17 U/L (ref 1–33)
AST SERPL-CCNC: 20 U/L (ref 1–32)
BILIRUB SERPL-MCNC: 0.4 MG/DL (ref 0–1.2)
BUN SERPL-MCNC: 10 MG/DL (ref 6–20)
BUN/CREAT SERPL: 13.9 (ref 7–25)
CALCIUM SERPL-MCNC: 9.4 MG/DL (ref 8.6–10.5)
CHLORIDE SERPL-SCNC: 104 MMOL/L (ref 98–107)
CO2 SERPL-SCNC: 26.1 MMOL/L (ref 22–29)
CREAT SERPL-MCNC: 0.72 MG/DL (ref 0.57–1)
EGFRCR SERPLBLD CKD-EPI 2021: 120.7 ML/MIN/1.73
ERYTHROCYTE [DISTWIDTH] IN BLOOD BY AUTOMATED COUNT: 12.5 % (ref 12.3–15.4)
GLOBULIN SER CALC-MCNC: 2.9 GM/DL
GLUCOSE SERPL-MCNC: 77 MG/DL (ref 65–99)
HBA1C MFR BLD: 5 % (ref 4.8–5.6)
HCG INTACT+B SERPL-ACNC: <1 MIU/ML
HCT VFR BLD AUTO: 38.9 % (ref 34–46.6)
HGB BLD-MCNC: 12.6 G/DL (ref 12–15.9)
MCH RBC QN AUTO: 28.6 PG (ref 26.6–33)
MCHC RBC AUTO-ENTMCNC: 32.4 G/DL (ref 31.5–35.7)
MCV RBC AUTO: 88.4 FL (ref 79–97)
PLATELET # BLD AUTO: 343 10*3/MM3 (ref 140–450)
POTASSIUM SERPL-SCNC: 4.5 MMOL/L (ref 3.5–5.2)
PROT SERPL-MCNC: 7.2 G/DL (ref 6–8.5)
RBC # BLD AUTO: 4.4 10*6/MM3 (ref 3.77–5.28)
REF LAB TEST METHOD: NORMAL
SODIUM SERPL-SCNC: 141 MMOL/L (ref 136–145)
TSH SERPL DL<=0.005 MIU/L-ACNC: 1.23 UIU/ML (ref 0.27–4.2)
WBC # BLD AUTO: 8.16 10*3/MM3 (ref 3.4–10.8)